# Patient Record
(demographics unavailable — no encounter records)

---

## 2024-11-22 NOTE — DISCUSSION/SUMMARY

## 2024-11-22 NOTE — PHYSICAL EXAM
[Alert] : alert [Normocephalic] : normocephalic [Closed Anterior Girard] : closed anterior fontanelle [Red Reflex] : red reflex bilateral [PERRL] : PERRL [Normally Placed Ears] : normally placed ears [Auricles Well Formed] : auricles well formed [Clear Tympanic membranes] : clear tympanic membranes [Light reflex present] : light reflex present [Bony landmarks visible] : bony landmarks visible [Discharge] : no discharge [Nares Patent] : nares patent [Palate Intact] : palate intact [Uvula Midline] : uvula midline [Tooth Eruption] : tooth eruption [Supple, full passive range of motion] : supple, full passive range of motion [Palpable Masses] : no palpable masses [Symmetric Chest Rise] : symmetric chest rise [Clear to Auscultation Bilaterally] : clear to auscultation bilaterally [Regular Rate and Rhythm] : regular rate and rhythm [S1, S2 present] : S1, S2 present [Murmurs] : no murmurs [+2 Femoral Pulses] : (+) 2 femoral pulses [Soft] : soft [Tender] : nontender [Distended] : nondistended [Bowel Sounds] : normoactive bowel sounds [Hepatomegaly] : no hepatomegaly [Splenomegaly] : no splenomegaly [Normal External Genitalia] : normal external genitalia [Clitoromegaly] : no clitoromegaly [Normal Vaginal Introitus] : normal vaginal introitus [No Abnormal Lymph Nodes Palpated] : no abnormal lymph nodes palpated [Symmetric Abduction and Rotation of Hips] : symmetric abduction and rotation of hips [Allis Sign] : negative Allis sign [Straight] : straight [Cranial Nerves Grossly Intact] : cranial nerves grossly intact [Rash or Lesions] : no rash/lesions

## 2025-01-17 NOTE — PHYSICAL EXAM
[Acute Distress] : no acute distress [Alert] : alert [Erythema] : erythema [Bulging] : not bulging [Purulent Effusion] : purulent effusion [Erythematous Oropharynx] : nonerythematous oropharynx [Clear to Auscultation Bilaterally] : clear to auscultation bilaterally [NL] : soft, nontender, nondistended, normal bowel sounds, no hepatosplenomegaly [Soft] : soft [Tender] : nontender [Distended] : nondistended [FreeTextEntry3] : right TM in neutral position

## 2025-01-17 NOTE — DISCUSSION/SUMMARY
[FreeTextEntry1] : Baby with left AOM. Will send Amoxicillin for 10 day course. Complete antibiotic course. Potential side effect of antibiotics includes but not limited to diarrhea. Provide ibuprofen as needed for pain or fever. If no improvement within 48 hours return for re-evaluation.   Untreated ear infections may lead to: Permanent hearing loss Problems with speech and language development Spread of infection to neighboring tissues and organs, such as mastoiditis or meningitis  Treat as directed and follow up as needed for fever trend, new, or worsening symptoms. Call or return if rash or significant vomiting develops after starting medication. Some side effects of medication that may not need special treatments include loose BMs.

## 2025-01-17 NOTE — HISTORY OF PRESENT ILLNESS
[FreeTextEntry6] : Temp 99-100F x 72 hours with random vomiting and/or post-tussive emesis. Having intermittent cough and congestion. Difficulty sleeping at night, wakes up when laid down.

## 2025-02-21 NOTE — PHYSICAL EXAM
[Alert] : alert [No Acute Distress] : no acute distress [Normocephalic] : normocephalic [Anterior [place] Closed] : anterior fontanelle closed [Red Reflex Bilateral] : red reflex bilateral [PERRL] : PERRL [Normally Placed Ears] : normally placed ears [Auricles Well Formed] : auricles well formed [Clear Tympanic membranes with present light reflex and bony landmarks] : clear tympanic membranes with present light reflex and bony landmarks [No Discharge] : no discharge [Nares Patent] : nares patent [Palate Intact] : palate intact [Uvula Midline] : uvula midline [Tooth Eruption] : tooth eruption  [Supple, full passive range of motion] : supple, full passive range of motion [No Palpable Masses] : no palpable masses [Symmetric Chest Rise] : symmetric chest rise [Clear to Auscultation Bilaterally] : clear to auscultation bilaterally [Regular Rate and Rhythm] : regular rate and rhythm [S1, S2 present] : S1, S2 present [No Murmurs] : no murmurs [+2 Femoral Pulses] : +2 femoral pulses [Soft] : soft [NonTender] : non tender [Non Distended] : non distended [Normoactive Bowel Sounds] : normoactive bowel sounds [No Hepatomegaly] : no hepatomegaly [No Splenomegaly] : no splenomegaly [Nelson 1] : Nelson 1 [No Clitoromegaly] : no clitoromegaly [Normal Vaginal Introitus] : normal vaginal introitus [Patent] : patent [Normally Placed] : normally placed [No Abnormal Lymph Nodes Palpated] : no abnormal lymph nodes palpated [No Clavicular Crepitus] : no clavicular crepitus [Negative Travis-Ortalani] : negative Travis-Ortalani [Symmetric Buttocks Creases] : symmetric buttocks creases [No Spinal Dimple] : no spinal dimple [NoTuft of Hair] : no tuft of hair [Cranial Nerves Grossly Intact] : cranial nerves grossly intact [No Rash or Lesions] : no rash or lesions

## 2025-04-11 NOTE — HISTORY OF PRESENT ILLNESS
[de-identified] : recheck [FreeTextEntry6] : Susan Nunez No HANSA   PMH  3 AOM since November  Her speech is good Dad had BMT

## 2025-04-11 NOTE — DISCUSSION/SUMMARY
[FreeTextEntry1] : L resistant OM  Trial Bactrim  ENT referral  Will hold on CBC and immune hoang (consider in future)  Ceftiaxone if fails

## 2025-04-29 NOTE — DISCUSSION/SUMMARY
[FreeTextEntry1] : Expectant care. Follow up as needed for fever trend, new, or worsening symptoms. FU ENT

## 2025-04-29 NOTE — PHYSICAL EXAM
[NL] : warm, clear [FreeTextEntry5] : B Dull retracted  [FreeTextEntry3] : retracted  [de-identified] : Vessicles in Post pharynx

## 2025-05-09 NOTE — HISTORY OF PRESENT ILLNESS
[de-identified] : 5-9-25 17mo F here for initial evaluation of frequent ear infections 5-6 ear infections in prior 6 months, all treated with abx More common in right ear  Most recent infection (March) required 3 abx courses  No otorrhea No fever  In  Was treated this week for possible pink eye  No hearing or speech concerns  Failed NBHS then passed at PMD after 2 more tries   +Chronic nasal congestion  Constant runny nose  Has never trialed nasal steroid No snoring or pausing or apneas No recent throat or lung infections  No family history of bleeding disorder or anesthesia complications Not followed by any other specialists  No asthma or RAD

## 2025-05-09 NOTE — ASSESSMENT
[FreeTextEntry1] : 17 month female with History of ear infections.  Discussed options including ear tubes versus observation and conservative therapy.  Discussed risks, benefits, and alternatives of ear tube placement including, but not limited to, bleeding, scarring, TM perforation, early extrusion, late extrusion, or need for further operation. We briefly discussed the risk of anesthesia. At this point family wishes to proceed with ear tube placement. Repeat audio after surgery.   Discussed at length that ear fluid itself is a result of a mechanical problem due to swelling and inflammation after URIs and that if not infected fluid that we often don't treat with antibiotics.  The underlying issues is eustachian tube dysfunction which can be transient in which we just wait for viral illnesses to run their course.  If the ETD is chronic that is when we discuss possible ear tubes.  Unfortunately, there is no good evidence about medications to help improve transient ETD but some have tried nasal sprays including steroids and allergy meds.  Discussed that when they have ear fluid during a URI we recommend waiting 2-3 days and treat supportively and with tylenol or motrin. If the infections persists past that time, can consider oral abx.  Ear tubes in this setting simply bypass the eustachian tube allowing it time to improve function on its own.  The hope is that fewer ear infections and not needing oral abx for ear infections with ear tubes in place (just ear drops).   Discussed that it is very common to have nasal congestion at this age. minimal adenoids on scope. Discussed that often lots of nasal saline and judicious suctioning can improve it and that often it gets better with time. Can consider a nasal steroid if worsens or does not improve with conservative therapy but often don't use in children this age. Discussed role that diet and reflux can play in nasal congestion in this age group and sometimes evaluation for diet elimination and nutrition consults is warranted.  Mild lip tie as well. Discussed at length that lip tie is controversial and most issues with lip tie occur later in life when a gap in the front teeth can be seen.    Plan: Bilateral PETs (CPT 00925-46) [facility] PCP clearance RTC 3 months post op

## 2025-05-09 NOTE — DATA REVIEWED
[FreeTextEntry1] : Parent used as independent historian given patient age and maturity.  The relevant medical records as well as any testing, imaging, and other order results were independently reviewed and interpreted by me and discussed with family.  Audiogram Indications  An audiogram was ordered to evaluate for eustachian tube dysfunction and/or conductive or sensorineural hearing loss given current symptoms/findings. Findings: Tymps: Type As/B Audio: SF , 2-4kHz, SDT 15 The above results were independently interpreted by me and discussed with the family.

## 2025-05-09 NOTE — PHYSICAL EXAM
[2+] : 2+ [Normal muscle strength, symmetry and tone of facial, head and neck musculature] : normal muscle strength, symmetry and tone of facial, head and neck musculature [Normal] : no cervical lymphadenopathy [Exposed Vessel] : left anterior vessel not exposed [Wheezing] : no wheezing [Increased Work of Breathing] : no increased work of breathing with use of accessory muscles and retractions [de-identified] : retracted [de-identified] : OME [de-identified] : mild lip tie

## 2025-05-15 NOTE — PHYSICAL EXAM

## 2025-05-15 NOTE — DISCUSSION/SUMMARY
[Normal Growth] : growth [Normal Development] : development [None] : No known medical problems [No Elimination Concerns] : elimination [No Feeding Concerns] : feeding [No Skin Concerns] : skin [Normal Sleep Pattern] : sleep [No Medications] : ~He/She~ is not on any medications [Parent/Guardian] : parent/guardian [] : The components of the vaccine(s) to be administered today are listed in the plan of care. The disease(s) for which the vaccine(s) are intended to prevent and the risks have been discussed with the caretaker.  The risks are also included in the appropriate vaccination information statements which have been provided to the patient's caregiver.  The caregiver has given consent to vaccinate. [FreeTextEntry1] : MCHAT AND SWYC Review  Healthy range and type of milk reviewed.  Continue table foods, 3 meals with 2-3 snacks per day.  [email or web address] for on line research for .   Read aloud to baby.  CBC and Lead screening update  Return in  6 mo for 2 year well child check.